# Patient Record
Sex: FEMALE | Race: WHITE | NOT HISPANIC OR LATINO | Employment: OTHER | URBAN - METROPOLITAN AREA
[De-identification: names, ages, dates, MRNs, and addresses within clinical notes are randomized per-mention and may not be internally consistent; named-entity substitution may affect disease eponyms.]

---

## 2019-09-06 ENCOUNTER — OFFICE VISIT (OUTPATIENT)
Dept: URGENT CARE | Facility: URGENT CARE | Age: 69
End: 2019-09-06
Payer: COMMERCIAL

## 2019-09-06 VITALS
HEIGHT: 65 IN | WEIGHT: 185 LBS | DIASTOLIC BLOOD PRESSURE: 86 MMHG | BODY MASS INDEX: 30.82 KG/M2 | SYSTOLIC BLOOD PRESSURE: 138 MMHG

## 2019-09-06 DIAGNOSIS — S80.811A ABRASION, LOWER LEG, ANTERIOR, RIGHT, INITIAL ENCOUNTER: Primary | ICD-10-CM

## 2019-09-06 DIAGNOSIS — S80.11XA TRAUMATIC ECCHYMOSIS OF RIGHT LOWER LEG, INITIAL ENCOUNTER: ICD-10-CM

## 2019-09-06 PROCEDURE — 99203 OFFICE O/P NEW LOW 30 MIN: CPT | Performed by: PHYSICIAN ASSISTANT

## 2019-09-06 ASSESSMENT — ENCOUNTER SYMPTOMS
WEAKNESS: 0
SHORTNESS OF BREATH: 0
NUMBNESS: 0
CHILLS: 0
HEADACHES: 0
FEVER: 0

## 2019-09-06 ASSESSMENT — MIFFLIN-ST. JEOR: SCORE: 1365.03

## 2019-09-07 NOTE — PROGRESS NOTES
SUBJECTIVE:   Brittney Grigsby is a 69 year old female presenting with a chief complaint of   Chief Complaint   Patient presents with     Urgent Care     Musculoskeletal Problem     fell twice on Tuesday and again Thursday. injury to right lower leg. Was on a canoe trip in [facility]       She is an established patient of [facility].    MS Injury/Pain  PMHx afib on warfarin  Onset of symptoms was on Tuesday and Thursday while walking on a portage in [facility] when she slipped and fell onto a jagged edge of a tree branch causing a cut. Used iodine immediately onto wounds and temporary dressing, applied bacitracin, and water impermeable bandage changed again today. Upper anterior shin while getting into a canoe on Thursday. Denies knee, ankle, wrist pain. Did not hit head with fall or LOC. Returned today and after portaging with walking and lifting to get out of [facility], increased soreness. During the drive home she had some stiffness, but with walking around since being home this has decreased. Describes overall dull, bruising pain. Denies numbness or tingling, loss of function. Denies erythema, warmth, or purulent drainage.   Location: right lower extremity  Context:       The injury happened while walking      Mechanism: fall       Patient experienced immediate pain, delayed swelling, was able to bear weight directly after injury  Course of symptoms is worsening.    Severity moderate  Current and Associated symptoms: Pain, Swelling and Bruising  Denies  Decreased range of motion  Aggravating Factors: increased walking  Therapies to improve symptoms include: tylenol  This is the first time this type of problem has occurred for this patient.     Review of Systems   Constitutional: Negative for chills and fever.   Respiratory: Negative for shortness of breath.    Cardiovascular: Negative for chest pain.   Neurological: Negative for weakness, numbness and headaches.       No past medical history on file.  No family history on  "file.  Current Outpatient Medications   Medication Sig Dispense Refill     ASPIRIN PO Take 81 mg by mouth daily       Calcium Citrate-Vitamin D (CALCIUM CITRATE + D PO) Take 1 tablet by mouth daily       Cholecalciferol (VITAMIN D3 PO) Take 1,000 Units by mouth daily       METOPROLOL SUCCINATE ER PO Take 100 mg by mouth daily       multivitamin, therapeutic with minerals (MULTI-VITAMIN) TABS Take 1 tablet by mouth daily       Omega-3 Fatty Acids (OMEGA-3 FISH OIL PO) Take 1 g by mouth daily       WARFARIN SODIUM PO Take 4 mg by mouth daily       Social History     Tobacco Use     Smoking status: Never Smoker     Smokeless tobacco: Never Used   Substance Use Topics     Alcohol use: Not on file       OBJECTIVE  /86   Ht 1.651 m (5' 5\")   Wt 83.9 kg (185 lb)   BMI 30.79 kg/m      Physical Exam   Constitutional: She is oriented to person, place, and time. She appears well-developed and well-nourished. No distress.   Pulmonary/Chest: Effort normal. She has no wheezes.   Neurological: She is alert and oriented to person, place, and time.   Skin:        Psychiatric: She has a normal mood and affect.   Nursing note and vitals reviewed.      Labs:  No results found for this or any previous visit (from the past 24 hour(s)).    X-Ray was not done.    ASSESSMENT:      ICD-10-CM    1. Abrasion, lower leg, anterior, right, initial encounter S80.811A    2. Traumatic ecchymosis of right lower leg, initial encounter S80.11XA         Medical Decision Making:    Differential Diagnosis:  MS Injury Pain: sprain, fracture, contusion, ecchymosis, abrasion, laceration.    Serious Comorbid Conditions:  Adult:  anticoagulation    PLAN:  Discussed possible xray, however given low impact mechanism and no point tenderness, no difficulty in bearing weight, patient agrees with continuing to monitor. Discussed concerning symptoms such as pain, pallor (pale skin tone), paresthesia (numbness feeling), pulselessness (faint pulse) and " paralysis to imply concern of compartment syndrome. Pt will follow-up closely with PCP early next week.     MS Injury/Pain  ice, heat, elevate, rest, Tylenol and bacitracin to wounds.     Followup:    In 3-4 day(s) follow up with  your Primary Care Provider if no improvement of symptoms    Patient Instructions     Patient Education     Cellulitis  Cellulitis is an infection of the deep layers of skin. A break in the skin, such as a cut or scratch, can let bacteria under the skin. If the bacteria get to deep layers of the skin, it can be serious. If not treated, cellulitis can get into the bloodstream and lymph nodes. The infection can then spread throughout the body. This causes serious illness.  Cellulitis causes the affected skin to become red, swollen, warm, and sore. The reddened areas have a visible border. An open sore may leak fluid (pus). You may have a fever, chills, and pain.  Cellulitis is treated with antibiotics taken for 7 to 10 days. An open sore may be cleaned and covered with cool wet gauze. Symptoms should get better 1 to 2 days after treatment is started. Make sure to take all the antibiotics for the full number of days until they are gone. Keep taking the medicine even if your symptoms go away.  Home care  Follow these tips:    Limit the use of the part of your body with cellulitis.     If the infection is on your leg, keep your leg raised while sitting. This will help to reduce swelling.    Take all of the antibiotic medicine exactly as directed until it is gone. Do not miss any doses, especially during the first 7 days. Don t stop taking the medicine when your symptoms get better.    Keep the affected area clean and dry.    Wash your hands with soap and warm water before and after touching your skin. Anyone else who touches your skin should also wash his or her hands. Don't share towels.  Follow-up care  Follow up with your healthcare provider, or as advised. If your infection does not go away  on the first antibiotic, your healthcare provider will prescribe a different one.  When to seek medical advice  Call your healthcare provider right away if any of these occur:    Red areas that spread    Swelling or pain that gets worse    Fluid leaking from the skin (pus)    Fever higher of 100.4  F (38.0  C) or higher after 2 days on antibiotics  Date Last Reviewed: 9/1/2016 2000-2018 [employer]. [address] [place] [address], [place] [address]. All rights reserved. This information is not intended as a substitute for professional medical care. Always follow your healthcare professional's instructions.

## 2019-09-07 NOTE — PATIENT INSTRUCTIONS
Patient Education     Cellulitis  Cellulitis is an infection of the deep layers of skin. A break in the skin, such as a cut or scratch, can let bacteria under the skin. If the bacteria get to deep layers of the skin, it can be serious. If not treated, cellulitis can get into the bloodstream and lymph nodes. The infection can then spread throughout the body. This causes serious illness.  Cellulitis causes the affected skin to become red, swollen, warm, and sore. The reddened areas have a visible border. An open sore may leak fluid (pus). You may have a fever, chills, and pain.  Cellulitis is treated with antibiotics taken for 7 to 10 days. An open sore may be cleaned and covered with cool wet gauze. Symptoms should get better 1 to 2 days after treatment is started. Make sure to take all the antibiotics for the full number of days until they are gone. Keep taking the medicine even if your symptoms go away.  Home care  Follow these tips:    Limit the use of the part of your body with cellulitis.     If the infection is on your leg, keep your leg raised while sitting. This will help to reduce swelling.    Take all of the antibiotic medicine exactly as directed until it is gone. Do not miss any doses, especially during the first 7 days. Don t stop taking the medicine when your symptoms get better.    Keep the affected area clean and dry.    Wash your hands with soap and warm water before and after touching your skin. Anyone else who touches your skin should also wash his or her hands. Don't share towels.  Follow-up care  Follow up with your healthcare provider, or as advised. If your infection does not go away on the first antibiotic, your healthcare provider will prescribe a different one.  When to seek medical advice  Call your healthcare provider right away if any of these occur:    Red areas that spread    Swelling or pain that gets worse    Fluid leaking from the skin (pus)    Fever higher of 100.4  F (38.0  C) or  higher after 2 days on antibiotics  Date Last Reviewed: 9/1/2016 2000-2018 [employer]. [address] [place] [address], [place] [address]. All rights reserved. This information is not intended as a substitute for professional medical care. Always follow your healthcare professional's instructions.

## 2021-12-18 ENCOUNTER — OFFICE VISIT (OUTPATIENT)
Dept: URGENT CARE | Facility: URGENT CARE | Age: 71
End: 2021-12-18
Payer: COMMERCIAL

## 2021-12-18 VITALS
HEART RATE: 68 BPM | SYSTOLIC BLOOD PRESSURE: 122 MMHG | TEMPERATURE: 97.5 F | BODY MASS INDEX: 29.95 KG/M2 | OXYGEN SATURATION: 97 % | DIASTOLIC BLOOD PRESSURE: 81 MMHG | WEIGHT: 180 LBS

## 2021-12-18 DIAGNOSIS — M54.50 ACUTE RIGHT-SIDED LOW BACK PAIN WITHOUT SCIATICA: Primary | ICD-10-CM

## 2021-12-18 PROCEDURE — 99204 OFFICE O/P NEW MOD 45 MIN: CPT | Performed by: PHYSICIAN ASSISTANT

## 2021-12-18 RX ORDER — HYDROCODONE BITARTRATE AND ACETAMINOPHEN 5; 325 MG/1; MG/1
1 TABLET ORAL EVERY 6 HOURS PRN
Qty: 10 TABLET | Refills: 0 | Status: SHIPPED | OUTPATIENT
Start: 2021-12-18 | End: 2021-12-21

## 2021-12-18 RX ORDER — PREDNISONE 20 MG/1
TABLET ORAL
Qty: 20 TABLET | Refills: 0 | Status: SHIPPED | OUTPATIENT
Start: 2021-12-18

## 2021-12-18 NOTE — PROGRESS NOTES
Acute right-sided low back pain without sciatica  - HYDROcodone-acetaminophen (NORCO) 5-325 MG tablet; Take 1 tablet by mouth every 6 hours as needed for severe pain  - predniSONE (DELTASONE) 20 MG tablet; Take 3 tabs by mouth daily x 3 days, then 2 tabs daily x 3 days, then 1 tab daily x 3 days, then 1/2 tab daily x 3 days.  - Physical Therapy Referral; Future    40 minutes spent on the date of the encounter doing chart review, history and exam, documentation and further activities per the note     See Patient Instructions  Patient Instructions     Patient Education     Relieving Back Pain  Back pain is a common problem. You can strain back muscles by lifting too much weight or just by moving the wrong way. Back strain can be uncomfortable, even painful. And it can take weeks or months to improve. To help yourself feel better and prevent future back strains, try these tips.  Important: Don't give aspirin to children or teens without first discussing it with your child's healthcare provider.  Ice    Ice reduces muscle pain and swelling. It helps most during the first 24 to 48 hours after an injury.    Wrap an ice pack or a bag of frozen peas in a thin towel. Never put ice directly on your skin.    Place the ice where your back hurts the most.    Don t ice for more than 20 minutes at a time.    You can use ice several times a day.  Medicines  Over-the-counter pain relievers include acetaminophen and anti-inflammatory medicines, which includes aspirin, naproxen, or ibuprofen. They can help ease discomfort. Some also reduce swelling.    Tell your healthcare provider about any medicines you are already taking.    Take medicines only as directed.  Manipulation and massage  Having manipulation by an osteopathic doctor or chiropractor may be helpful. Getting a massage also may help.   Heat  After the first 48 hours, heat can relax sore muscles and improve blood flow.    Try a warm bath or shower. Or use a heating pad set  on low. To prevent a burn, keep a cloth between you and the heating pad.    Don t use a heating pad for more than 15 minutes at a time. Never sleep on a heating pad.  [employer] last reviewed this educational content on 6/1/2018 2000-2021 [employer]. All rights reserved. This information is not intended as a substitute for professional medical care. Always follow your healthcare professional's instructions.               GENEVA [facility] URGENT CARE    Subjective   71 year old who presents to clinic today for the following health issues:    Urgent Care and Back Pain       HPI     Pain History:  When did you first notice your pain? - Less than 1 week   Have you seen anyone else for your pain? No  Where in your body do you have pain? Back Pain  Onset/Duration: Wednesday   Description:   Location of pain: low back right  Character of pain: sharp and stabbing  Pain radiation: none  New numbness or weakness in legs, not attributed to pain: no   Intensity: severe  Progression of Symptoms: constant  History:   Specific cause: none  Pain interferes with job: YES  History of back problems: recurrent self limited episodes of low back pain in the past  Any previous MRI or X-rays: Patient has had an MRI that showed lumbar spinal stenosis and a herniated disc on 2014  Sees a specialist for back pain: No  Alleviating factors:   Improved by: ice and rest    Precipitating factors:  Worsened by: Changes in position. Patient thinks leaning forward is more painful.   Therapies tried and outcome: Patient has a prescription for tylenol with codeine which was not helpful.     Review of Systems   Review of Systems   See HPI     Objective    Temp: 97.5  F (36.4  C) Temp src: Tympanic BP: 122/81 Pulse: 68     SpO2: 97 %       Physical Exam   Physical Exam  Constitutional:       General: She is not in acute distress.     Appearance: Normal appearance. She is normal weight. She is not ill-appearing,  toxic-appearing or diaphoretic.   HENT:      Head: Normocephalic and atraumatic.   Musculoskeletal:      Lumbar back: Spasms and tenderness present. No swelling, edema, deformity, signs of trauma, lacerations or bony tenderness. Normal range of motion. No scoliosis.        Back:       Comments: Right and left sided carmen test is positive. SLR is positive on the right side and negative on the left.    Neurological:      General: No focal deficit present.      Mental Status: She is alert and oriented to person, place, and time. Mental status is at baseline.      Gait: Gait normal.   Psychiatric:         Mood and Affect: Mood normal.         Behavior: Behavior normal.         Thought Content: Thought content normal.         Judgment: Judgment normal.

## 2021-12-18 NOTE — PATIENT INSTRUCTIONS
Patient Education     Relieving Back Pain  Back pain is a common problem. You can strain back muscles by lifting too much weight or just by moving the wrong way. Back strain can be uncomfortable, even painful. And it can take weeks or months to improve. To help yourself feel better and prevent future back strains, try these tips.  Important: Don't give aspirin to children or teens without first discussing it with your child's healthcare provider.  Ice    Ice reduces muscle pain and swelling. It helps most during the first 24 to 48 hours after an injury.    Wrap an ice pack or a bag of frozen peas in a thin towel. Never put ice directly on your skin.    Place the ice where your back hurts the most.    Don t ice for more than 20 minutes at a time.    You can use ice several times a day.  Medicines  Over-the-counter pain relievers include acetaminophen and anti-inflammatory medicines, which includes aspirin, naproxen, or ibuprofen. They can help ease discomfort. Some also reduce swelling.    Tell your healthcare provider about any medicines you are already taking.    Take medicines only as directed.  Manipulation and massage  Having manipulation by an osteopathic doctor or chiropractor may be helpful. Getting a massage also may help.   Heat  After the first 48 hours, heat can relax sore muscles and improve blood flow.    Try a warm bath or shower. Or use a heating pad set on low. To prevent a burn, keep a cloth between you and the heating pad.    Don t use a heating pad for more than 15 minutes at a time. Never sleep on a heating pad.  [employer] last reviewed this educational content on 6/1/2018 2000-2021 [employer]. All rights reserved. This information is not intended as a substitute for professional medical care. Always follow your healthcare professional's instructions.

## 2022-01-04 ENCOUNTER — NURSE TRIAGE (OUTPATIENT)
Dept: NURSING | Facility: CLINIC | Age: 72
End: 2022-01-04
Payer: COMMERCIAL

## 2022-01-04 NOTE — TELEPHONE ENCOUNTER
Triage Call:    Pt was seen on 12/18/2021 for severe back pain in the [facility] by Dr. Spears  Dx: Illiac pain with unknown cause  12 day course of prednisone was completed and it helped the pain immediately, but now that she has stopped this the pain has returned    Pain has returned but is not as severe as it was originally    Pt finished prednisone  For the first time last night she took vicodin which helped last night 10pm, 5am, but she doesn't want to take that during the daytime  Pt can not take nsaids because she is on a blood thinner    Pain rated: 9/10 with motion; sharp pain that lasts a few seconds  1-2 at rest  Pain is isolated to the one area; does not radiate    Pt is going to [place] for a few months starting tomorrow and is worried about the pain    Disposition: See today in office. Pt was given care advice. She has an [facility] PCP and will call them. If no appts, patient will be seen in [facility].     Nataly Lopez RN  [facility] Nurse Advisor 11:29 AM 1/4/2022    Reason for Disposition    SEVERE back pain (e.g., excruciating, unable to do any normal activities) and not improved after pain medicine and CARE ADVICE    Additional Information    Negative: Passed out (i.e., fainted, collapsed and was not responding)    Negative: Shock suspected (e.g., cold/pale/clammy skin, too weak to stand, low BP, rapid pulse)    Negative: Sounds like a life-threatening emergency to the triager    Negative: Major injury to the back (e.g., MVA, fall > 10 feet or 3 meters, penetrating injury, etc.)    Negative: Pain in the upper back over the ribs (rib cage) that radiates (travels) into the chest    Negative: Pain in the upper back over the ribs (rib cage) and worsened by coughing (or clearly increases with breathing)    Negative: SEVERE back pain of sudden onset and age > 60    Negative: SEVERE abdominal pain (e.g., excruciating)    Negative: Abdominal pain and age > 60    Negative: Unable to urinate (or only a few  drops) and bladder feels very full    Negative: Loss of bladder or bowel control (urine or bowel incontinence; wetting self, leaking stool) of new onset    Negative: Numbness (loss of sensation) in groin or rectal area    Negative: Pain radiates into groin, scrotum    Negative: Blood in urine (red, pink, or tea-colored)    Negative: Vomiting and pain over lower ribs of back (i.e., flank - kidney area)    Negative: Weakness of a leg or foot (e.g., unable to bear weight, dragging foot)    Negative: Patient sounds very sick or weak to the triager    Negative: Fever > 100.4 F (38.0 C) and flank pain    Negative: Pain or burning with passing urine (urination)    Protocols used: BACK PAIN-A-OH    COVID 19 Nurse Triage Plan/Patient Instructions    Please be aware that novel coronavirus (COVID-19) may be circulating in the community. If you develop symptoms such as fever, cough, or SOB or if you have concerns about the presence of another infection including coronavirus (COVID-19), please contact your health care provider or visit [email or web address].     Disposition/Instructions    In-Person Visit with provider recommended. Reference Visit Selection Guide.    Thank you for taking steps to prevent the spread of this virus.  o Limit your contact with others.  o Wear a simple mask to cover your cough.  o Wash your hands well and often.    Resources    [facility]: About COVID-19: [email or web address]    CDC: What to Do If You're Sick: www.cdc.gov/coronavirus/2019-ncov/about/steps-when-sick.html    CDC: Ending Home Isolation: www.cdc.gov/coronavirus/2019-ncov/hcp/disposition-in-home-patients.html     CDC: Caring for Someone: www.cdc.gov/coronavirus/2019-ncov/if-you-are-sick/care-for-someone.html     [facility]: Interim Guidance for Hospital Discharge to Home: www.health.[facility].[place]/diseases/coronavirus/hcp/hospdischarge.pdf    [facility] clinical trials (COVID-19 research studies):  [email or web address]-clinical-trials     Below are the COVID-19 hotlines at the [place] Department of [facility] ([facility]). Interpreters are available.   o For health questions: Call 247-077-2622 or 1-145.597.8630 (7 a.m. to 7 p.m.)  o For questions about schools and childcare: Call 502-322-3730 or 1-275.664.5172 (7 a.m. to 7 p.m.)

## 2022-04-11 ENCOUNTER — THERAPY VISIT (OUTPATIENT)
Dept: PHYSICAL THERAPY | Facility: CLINIC | Age: 72
End: 2022-04-11
Attending: PHYSICIAN ASSISTANT
Payer: COMMERCIAL

## 2022-04-11 DIAGNOSIS — M54.50 ACUTE RIGHT-SIDED LOW BACK PAIN WITHOUT SCIATICA: ICD-10-CM

## 2022-04-11 PROCEDURE — 97161 PT EVAL LOW COMPLEX 20 MIN: CPT | Mod: GP | Performed by: PHYSICAL THERAPIST

## 2022-04-11 PROCEDURE — 97112 NEUROMUSCULAR REEDUCATION: CPT | Mod: GP | Performed by: PHYSICAL THERAPIST

## 2022-04-11 NOTE — PROGRESS NOTES
[facility]    OUTPATIENT Physical Therapy ORTHOPEDIC EVALUATION  PLAN OF TREATMENT FOR OUTPATIENT REHABILITATION  (COMPLETE FOR INITIAL CLAIMS ONLY)  Patient's Last Name, First Name, M.I.  YOB: 1950  Brittney Grigsby    Provider s Name:  [facility]   Medical Record No.  [id number]   Start of Care Date:  04/11/22   Onset Date:   12/18/21   Type:     _X__PT   ___OT Medical Diagnosis:    Encounter Diagnosis   Name Primary?    Acute right-sided low back pain without sciatica         Treatment Diagnosis:  low back pain        Goals:     04/11/22 0500   Body Part   Goals listed below are for low back   Goal #1   Goal #1 sitting   Previous Functional Level No restrictions   Current Functional Level Minutes patient can sit   Performance level 30-60 tightness upon standing   STG Target Performance Hours patient will be able to sit   Performance level 1 without stiffness upon standing   Rationale for personal hygiene;for community transportation;to allow rest from standing   Due date 05/09/22   LTG Target Performance Hours patient will be able to sit   Performance Level 2   Rationale for personal hygiene;for community transportation   Due date 06/06/22       Therapy Frequency:  1 x per week  Predicted Duration of Therapy Intervention:  6 weeks    Beulah Rendon PT                 I CERTIFY THE NEED FOR THESE SERVICES FURNISHED UNDER        THIS PLAN OF TREATMENT AND WHILE UNDER MY CARE     (Physician attestation of this document indicates review and certification of the therapy plan).                     Certification Date From:  04/11/22   Certification Date To:  07/09/22    Referring Provider:  Aren Spears    Initial Assessment        See Epic Evaluation SOC Date: 04/11/22

## 2022-04-11 NOTE — PROGRESS NOTES
[facility]    OUTPATIENT Physical Therapy ORTHOPEDIC EVALUATION  PLAN OF TREATMENT FOR OUTPATIENT REHABILITATION  (COMPLETE FOR INITIAL CLAIMS ONLY)  Patient's Last Name, First Name, M.I.  YOB: 1950  Brittney Grigsby    Provider s Name:  [facility]   Medical Record No.  [id number]   Start of Care Date:  04/11/22   Onset Date:   12/18/21   Type:     _X__PT   ___OT Medical Diagnosis:    Encounter Diagnosis   Name Primary?    Acute right-sided low back pain without sciatica         Treatment Diagnosis:  low back pain        Goals:     04/11/22 0500   Body Part   Goals listed below are for low back   Goal #1   Goal #1 sitting   Previous Functional Level No restrictions   Current Functional Level Minutes patient can sit   Performance level 30-60 tightness upon standing   STG Target Performance Hours patient will be able to sit   Performance level 1 without stiffness upon standing   Rationale for personal hygiene;for community transportation;to allow rest from standing   Due date 05/09/22   LTG Target Performance Hours patient will be able to sit   Performance Level 2   Rationale for personal hygiene;for community transportation   Due date 06/06/22       Therapy Frequency:  1 x per week  Predicted Duration of Therapy Intervention:  6 weeks    Beulah Rendon PT                 I CERTIFY THE NEED FOR THESE SERVICES FURNISHED UNDER        THIS PLAN OF TREATMENT AND WHILE UNDER MY CARE     (Physician attestation of this document indicates review and certification of the therapy plan).                     Certification Date From:  04/11/22   Certification Date To:  07/09/22    Referring Provider:  Aren Spears    Initial Assessment        See Epic Evaluation SOC Date: 04/11/22

## 2022-04-11 NOTE — PROGRESS NOTES
Physical Therapy Initial Evaluation  April 11, 2022     Precautions/Restrictions/MD instructions: PT eval and treat.    Subjective:   Date of Onset: MD order on 12/18/21  C/C: stiffness in her lower back, right sided low back discomfort  Quality of pain is dull and aching and burning. Pains are described as intermittent in nature. Pain is worse: morning. Pain is rated 1-5/10.   History of symptoms: Pains began suddenly as the result of unknown origins. Since onset, symptoms are improving. Previous episodes:hx of disc issue and dx of stenosis 10 years ago  Worsened by: sitting, sit<>stand    Alleviated by: oral steroids, seat heater in car, lumbar roll, using pillows when sleeping.  Exercises: Lumbar hip roll, bridge, SLR  General health as reported by patient: good  Pertinent medical/surgical history: history of knee pain with stairs cancer- basal cell, HTN , history of fractures, implanted device, overweight.   Imaging: none recent. Current occupational status: retired. Patient's goals are: decrease pain, improve tolerance to sit<>stand, reduce morning stiffness. Return to MD:  PRN.       Objective:  LUMBAR:    Neurological: All results within normal limits unless otherwise noted.    Motor Deficit:  Myotomes L R   L1-2 (hip flexion) 5/5 5/5   L3 (knee extension) 5/5 5/5   L4 (ankle DF) 5/5 5/5   L5 (g. toe ext) 5/5 5/5   S1 (ankle PF or knee flex) WNL WNL     Sensory Deficit, Reflexes: Reduced light touch sensation at posterior gastroc and under left big toe consistent with previous back injury. Intact to light touch sensation in all other LE dermatomes. Achilles and patellar reflexes 1+ B.    Dural Signs:   L R   SLR - Mildly + for hamstring discomfort       AROM: (Major, Moderate, Minimal or Nil loss)  Movement Loss Claude Mod Min Nil Pain   Flexion   x     Extension   x     Side Gliding L   x     Side Gliding R   x         AROM:   L R   HIP     Flexion 140 130   IR 45 45   ER 45 50   KNEE     Flexion WNL WNL    Extension WNL WNL       Special Tests:   L R   DEVANG - + for tightness   FADIR - -   Scour Test - -           Assessment/Plan:    The patient is a 71 year old female with chief complaint of low back pain.    The patient has the following significant findings with corresponding treatment plan.  Diagnosis 1:  Right sided low back pain    Pain -  hot/cold therapy, manual therapy, splint/taping/bracing/orthotics, self management, education, directional preference exercise and home program  Decreased ROM/flexibility - manual therapy and therapeutic exercise  Decreased strength - therapeutic exercise and therapeutic activities  Impaired balance - neuro re-education and therapeutic activities  Decreased proprioception - neuro re-education and therapeutic activities  Impaired gait - gait training  Impaired muscle performance - neuro re-education  Decreased function - therapeutic activities  Impaired posture - neuro re-education        Therapy Evaluation Codes:   Cumulative Therapy Evaluation is: Low complexity.    Previous and current functional limitations:  (See Goal Flow Sheet for this information)    Short term and Long term goals: (See Goal Flow Sheet for this information)     Communication ability:  Patient appears to be able to clearly communicate and understand verbal and written communication and follow directions correctly.  Treatment Explanation - The following has been discussed with the patient: RX ordered/plan of care, anticipated outcomes, and possible risks and side effects.  This patient would benefit from PT intervention to resume normal activities.   Rehab potential is good.    Frequency:  1 X week, once daily  Duration:  for 4 weeks  Discharge Plan: Achieve all LTGs, be independent in home treatment program, and reach maximal therapeutic benefit.    Please refer to the daily flowsheet for treatment today, total treatment time and time spent performing 1:1 timed codes.

## 2022-04-27 ENCOUNTER — THERAPY VISIT (OUTPATIENT)
Dept: PHYSICAL THERAPY | Facility: CLINIC | Age: 72
End: 2022-04-27
Payer: COMMERCIAL

## 2022-04-27 DIAGNOSIS — M54.50 ACUTE RIGHT-SIDED LOW BACK PAIN WITHOUT SCIATICA: Primary | ICD-10-CM

## 2022-04-27 PROCEDURE — 97112 NEUROMUSCULAR REEDUCATION: CPT | Mod: GP | Performed by: PHYSICAL THERAPIST

## 2022-04-27 PROCEDURE — 97140 MANUAL THERAPY 1/> REGIONS: CPT | Mod: GP | Performed by: PHYSICAL THERAPIST

## 2022-06-06 PROBLEM — M54.50 ACUTE RIGHT-SIDED LOW BACK PAIN WITHOUT SCIATICA: Status: RESOLVED | Noted: 2022-04-11 | Resolved: 2022-06-06

## 2022-06-06 NOTE — PROGRESS NOTES
Discharge Note    Progress reporting period is from initial evaluation date (please see noted date below) to Apr 27, 2022.  No linked episodes      Brittney failed to follow up and current status is unknown.  Please see information below for last relevant information on current status.  Patient seen for 2 visits.    SUBJECTIVE  Subjective changes noted by patient:  Min flare of stenosis related symptoms. Reports in the AM she sometimes is more sore but this reduces after her shower.  Gets some burning pain after HEP but not otherwise  .  Current pain level is  .     Previous pain level was   .   Changes in function:  Yes (See Goal flowsheet attached for changes in current functional level)  Adverse reaction to treatment or activity: None    OBJECTIVE  Changes noted in objective findings: Min limit hip flexion and IR on the R. Min + SLR on R     ASSESSMENT/PLAN  Diagnosis: low back pain   Updated problem list and treatment plan:   Pain - HEP  Decreased ROM/flexibility - HEP  Decreased function - HEP  STG/LTGs have been met or progress has been made towards goals:  Yes, please see goal flowsheet for most current information  Assessment of Progress: current status is unknown.    Last current status: Pt is progressing as expected   Self Management Plans:  HEP  I have re-evaluated this patient and find that the nature, scope, duration and intensity of the therapy is appropriate for the medical condition of the patient.  Brittney continues to require the following intervention to meet STG and LTG's:  HEP.    Recommendations:  Discharge with current home program.  Patient to follow up with MD as needed.    Please refer to the daily flowsheet for treatment today, total treatment time and time spent performing 1:1 timed codes.

## 2024-07-14 ENCOUNTER — OFFICE VISIT (OUTPATIENT)
Dept: URGENT CARE | Facility: URGENT CARE | Age: 74
End: 2024-07-14
Payer: COMMERCIAL

## 2024-07-14 ENCOUNTER — ANCILLARY PROCEDURE (OUTPATIENT)
Dept: GENERAL RADIOLOGY | Facility: CLINIC | Age: 74
End: 2024-07-14
Attending: NURSE PRACTITIONER
Payer: COMMERCIAL

## 2024-07-14 VITALS
HEART RATE: 73 BPM | OXYGEN SATURATION: 100 % | DIASTOLIC BLOOD PRESSURE: 75 MMHG | TEMPERATURE: 97.4 F | RESPIRATION RATE: 14 BRPM | SYSTOLIC BLOOD PRESSURE: 140 MMHG

## 2024-07-14 DIAGNOSIS — S69.92XA WRIST INJURY, LEFT, INITIAL ENCOUNTER: Primary | ICD-10-CM

## 2024-07-14 DIAGNOSIS — S69.92XA WRIST INJURY, LEFT, INITIAL ENCOUNTER: ICD-10-CM

## 2024-07-14 DIAGNOSIS — S62.115A NONDISPLACED FRACTURE OF TRIQUETRUM (CUNEIFORM) BONE, LEFT WRIST, INITIAL ENCOUNTER FOR CLOSED FRACTURE: ICD-10-CM

## 2024-07-14 PROCEDURE — 73110 X-RAY EXAM OF WRIST: CPT | Mod: TC | Performed by: RADIOLOGY

## 2024-07-14 PROCEDURE — 99214 OFFICE O/P EST MOD 30 MIN: CPT | Performed by: NURSE PRACTITIONER

## 2024-07-14 ASSESSMENT — PAIN SCALES - GENERAL: PAINLEVEL: EXTREME PAIN (8)

## 2024-07-14 NOTE — PROGRESS NOTES
Chief Complaint   Patient presents with    Urgent Care    Wrist Injury     Patient presents with her left wrist swollen and pain after a door handle swung open and hit it.     SUBJECTIVE:  Brittney Grigsby is a 73 year old female presenting with a left wrist injury.  She accidentally slammed the door on wrist with worsening distal ulna pain erythema edema limited ROM. Prior fracture, on warfarin.  No numbness tingling pallor or cool sensation pulselessness.    No past medical history on file.  Current Outpatient Medications   Medication Sig Dispense Refill    ASPIRIN PO Take 81 mg by mouth daily      Calcium Citrate-Vitamin D (CALCIUM CITRATE + D PO) Take 1 tablet by mouth daily      Cholecalciferol (VITAMIN D3 PO) Take 1,000 Units by mouth daily      METOPROLOL SUCCINATE ER PO Take 100 mg by mouth daily      multivitamin, therapeutic with minerals (MULTI-VITAMIN) TABS Take 1 tablet by mouth daily      Omega-3 Fatty Acids (OMEGA-3 FISH OIL PO) Take 1 g by mouth daily      predniSONE (DELTASONE) 20 MG tablet Take 3 tabs by mouth daily x 3 days, then 2 tabs daily x 3 days, then 1 tab daily x 3 days, then 1/2 tab daily x 3 days. 20 tablet 0    WARFARIN SODIUM PO Take 4 mg by mouth daily       No current facility-administered medications for this visit.     Social History     Tobacco Use    Smoking status: Never    Smokeless tobacco: Never   Substance Use Topics    Alcohol use: Not on file     Allergies   Allergen Reactions    Adhesive Tape Rash     blistery rash to adhesive bandage with her BCC shoulder surgery       Review of Systems  All systems negative except for those listed above in HPI.    OBJECTIVE:   BP (!) 140/75 (BP Location: Right arm)   Pulse 73   Temp 97.4  F (36.3  C) (Temporal)   Resp 14   SpO2 100%     Physical Exam  Vitals reviewed.   Constitutional:       General: She is not in acute distress.     Appearance: Normal appearance. She is not ill-appearing, toxic-appearing or diaphoretic.    Cardiovascular:      Pulses: Normal pulses.   Pulmonary:      Effort: Pulmonary effort is normal.   Musculoskeletal:         General: Swelling, tenderness and signs of injury present.   Skin:     Findings: Bruising and erythema present.   Neurological:      General: No focal deficit present.      Mental Status: She is alert and oriented to person, place, and time.      Sensory: No sensory deficit.   Psychiatric:         Mood and Affect: Mood normal.         Behavior: Behavior normal.       X-ray done in clinic read by me as positive for questionable triquetrum fracture on lateral view.  Results for orders placed or performed in visit on 07/14/24   XR Wrist Left G/E 3 Views     Status: None    Narrative    EXAM: XR WRIST LEFT G/E 3 VIEWS  LOCATION: [facility]  DATE: 7/14/2024    INDICATION: door slammed on wrist with worsening distal ulna pain erythema edema limited ROM, prior fracture, on warfarin  COMPARISON: None.      Impression    IMPRESSION: Degenerative change at the DRUJ. Soft tissue swelling about the wrist. No displaced fractures identified but there is slight irregularity along the dorsal aspect of the triquetrum seen on the lateral view which could represent a nondisplaced   cortical chip fracture.     ASSESSMENT:    ICD-10-CM    1. Wrist injury, left, initial encounter  S69.92XA XR Wrist Left G/E 3 Views     Orthopedic  Referral     Wrist/Arm/Hand Bracking Supplies Order Wrist Brace; Left; non-thumb spica      2. Nondisplaced fracture of triquetrum (cuneiform) bone, left wrist, initial encounter for closed fracture  S62.115A Orthopedic  Referral     Wrist/Arm/Hand Bracking Supplies Order Wrist Brace; Left; non-thumb spica        PLAN:    Questionable triquetrum fracture  Wrist splint given in clinic  Urgent Ortho referral placed for second opinion regarding fracture and casting  Rest ice Tylenol topicals elevate  Warfarin can certainly increase the level of  edema bleeding hematoma bruising  ER if severe worsening pain numbness tingling pallor cold sensation pulselessness    Follow up with primary care provider with any problems, questions or concerns or if symptoms worsen or fail to improve. Patient agreed to plan and verbalized understanding.    LASHAE [facility]

## 2024-07-15 ENCOUNTER — TELEPHONE (OUTPATIENT)
Dept: OTHER | Age: 74
End: 2024-07-15

## 2024-07-15 NOTE — TELEPHONE ENCOUNTER
Orthopedic/Sports Medicine Fracture Triage    Incoming call/or message from call center member.    Fracture type: Left Wrist.    The patient is in a  splint.    Date of injury 7/14/24.    Triaged by: Dr. Benitez .    Determined to be managed Non operatively.    Needs to be seen within 1 week.    Additional Comments/information:     Nondisplaced fracture of triquetrum (cuneiform) bone, left wrist, initial encounter for closed fracture        Jeferson Briceno MA

## 2024-07-15 NOTE — TELEPHONE ENCOUNTER
What is the Concern:  Fracture  Date the concern started: Saturday am  Injury related? yes, not sure how it happened so assumes was an injury  Is this related to recent surgery?no  Laceration?  No  Body part affected:  L wrist  Has Patient been evaluated for condition? yes  Location the patient was evaluated and treated for the condition?   , Location [facility]  Who is referring provider, (name and clinic location) Jaleesa Rosario at [facility]  What images have been done? X-Ray; Location and City where images were taken: XR x 3  Could we send this information to you in [employer] or would you prefer to receive a phone call?:   Patient would prefer a phone call   Okay to leave a detailed message?: Yes at Cell number on file:    Telephone Information:   Mobile [phone]        PAST SURGICAL HISTORY:  No significant past surgical history

## 2024-07-19 DIAGNOSIS — M25.532 LEFT WRIST PAIN: Primary | ICD-10-CM

## 2024-07-23 ENCOUNTER — ANCILLARY PROCEDURE (OUTPATIENT)
Dept: GENERAL RADIOLOGY | Facility: CLINIC | Age: 74
End: 2024-07-23
Attending: FAMILY MEDICINE
Payer: COMMERCIAL

## 2024-07-23 ENCOUNTER — PRE VISIT (OUTPATIENT)
Dept: ORTHOPEDICS | Facility: CLINIC | Age: 74
End: 2024-07-23

## 2024-07-23 ENCOUNTER — OFFICE VISIT (OUTPATIENT)
Dept: ORTHOPEDICS | Facility: CLINIC | Age: 74
End: 2024-07-23
Attending: NURSE PRACTITIONER
Payer: COMMERCIAL

## 2024-07-23 DIAGNOSIS — S62.115A NONDISPLACED FRACTURE OF TRIQUETRUM (CUNEIFORM) BONE, LEFT WRIST, INITIAL ENCOUNTER FOR CLOSED FRACTURE: ICD-10-CM

## 2024-07-23 DIAGNOSIS — S69.92XA WRIST INJURY, LEFT, INITIAL ENCOUNTER: ICD-10-CM

## 2024-07-23 PROCEDURE — 73110 X-RAY EXAM OF WRIST: CPT | Mod: LT | Performed by: RADIOLOGY

## 2024-07-23 PROCEDURE — 99203 OFFICE O/P NEW LOW 30 MIN: CPT | Performed by: FAMILY MEDICINE

## 2024-07-23 NOTE — PROGRESS NOTES
[facility] CLINICS AND SURGERY CENTER  SPORTS & ORTHOPEDIC CLINIC VISIT     Jul 23, 2024        ASSESSMENT & PLAN    74 yo with left wrist pain after injury and suspected triquetral fracture.    Reviewed imaging and assessment with patient in detail  Continue bracing for 3 weeks  Follow up at that time for re eval and reimaging.     Ignacio Lee MD  [facility] SPORTS MEDICINE [facility]    -----  Chief Complaint   Patient presents with    Left Wrist - Pain       SUBJECTIVE  Brittney Grigsby is a/an 73 year old female who is seen as an  referral for evaluation of  left wrist injury.     The patient is seen by themselves.  The patient is Right handed    Onset: 7/12/24. Patient describes injury as possibly carrying something into her house and the door slammed against her wrist.   Location of Pain: left wrist   Worsened by: When out of the brace   Better with: Wrist brace, tylenol   Treatments tried: casting/splinting/bracing, tylenol   Associated symptoms: swelling    Orthopedic/Surgical history: NO  Social History/Occupation: Retired       REVIEW OF SYSTEMS:  Do you have fever, chills, weight loss? No  Do you have any vision problems? No  Do you have any chest pain or edema? No  Do you have any shortness of breath or wheezing?  No  Do you have stomach problems? No  Do you have any numbness or focal weakness? No  Do you have diabetes? No  Do you have problems with bleeding or clotting? Yes, on warfarin   Do you have an rashes or other skin lesions? No    OBJECTIVE:  There were no vitals taken for this visit.     General  - alert, pleasant, no distress  CV  - normal radial pulse, cap refill brisk  Musculoskeletal - wrist  - inspection: normal joint alignment, no obvious deformity, no swelling  - palpation: ttp over ulnar hand in the triquetral area. Otherwise no bony or soft tissue tenderness, no tenderness at the anatomical snuffbox  - ROM:  90 deg flexion   70 deg extension   25 deg  abduction   65 deg adduction  - strength: 5/5  strength, 5/5 flexion, extension, pronation, supination, adduction, abduction  - special tests:  None     Neuro  - no sensory or motor deficit, grossly normal coordination, normal muscle tone  Skin  - no ecchymosis, erythema, warmth, or induration, no obvious rash        RADIOLOGY:    3 view xrays of left wrist  performed and reviewed independently demonstrating previous seen triquetral fracture not well seen. See EMR for formal radiology report.

## 2024-07-23 NOTE — TELEPHONE ENCOUNTER
DIAGNOSIS: Nondisplaced fracture of triquetrum (cuneiform) bone, left wrist / Jaleesa Rosario NP / [facility] Medicare / images 7/14 @ / no prior surg / Scheduled per TE     APPOINTMENT DATE: 7.23.24   NOTES STATUS DETAILS   DISCHARGE REPORT from the ER Internal 7.14.24  Marlene     MEDICATION LIST Internal    XRAYS (IMAGES & REPORTS) Internal *sched* for 7.23.24  XR Wrist Left    7.14.24  XR Wrist Left

## 2024-07-23 NOTE — LETTER
7/23/2024      RE: Brittney Grigsby  [address] [place] [address]     Dear Colleague,    Thank you for referring your patient, Brittney Grigsby, to the [facility] SPORTS MEDICINE [facility]. Please see a copy of my visit note below.      [facility] CLINICS AND SURGERY CENTER  SPORTS & ORTHOPEDIC CLINIC VISIT     Jul 23, 2024        ASSESSMENT & PLAN    72 yo with left wrist pain after injury and suspected triquetral fracture.    Reviewed imaging and assessment with patient in detail  Continue bracing for 3 weeks  Follow up at that time for re eval and reimaging.     Ignacio Lee MD  [facility] SPORTS [facility]    -----  Chief Complaint   Patient presents with     Left Wrist - Pain       SUBJECTIVE  Brittney Grigsby is a/an 73 year old female who is seen as an  referral for evaluation of  left wrist injury.     The patient is seen by themselves.  The patient is Right handed    Onset: 7/12/24. Patient describes injury as possibly carrying something into her house and the door slammed against her wrist.   Location of Pain: left wrist   Worsened by: When out of the brace   Better with: Wrist brace, tylenol   Treatments tried: casting/splinting/bracing, tylenol   Associated symptoms: swelling    Orthopedic/Surgical history: NO  Social History/Occupation: Retired       REVIEW OF SYSTEMS:  Do you have fever, chills, weight loss? No  Do you have any vision problems? No  Do you have any chest pain or edema? No  Do you have any shortness of breath or wheezing?  No  Do you have stomach problems? No  Do you have any numbness or focal weakness? No  Do you have diabetes? No  Do you have problems with bleeding or clotting? Yes, on warfarin   Do you have an rashes or other skin lesions? No    OBJECTIVE:  There were no vitals taken for this visit.     General  - alert, pleasant, no distress  CV  - normal radial pulse, cap refill brisk  Musculoskeletal - wrist  -  inspection: normal joint alignment, no obvious deformity, no swelling  - palpation: ttp over ulnar hand in the triquetral area. Otherwise no bony or soft tissue tenderness, no tenderness at the anatomical snuffbox  - ROM:  90 deg flexion   70 deg extension   25 deg abduction   65 deg adduction  - strength: 5/5  strength, 5/5 flexion, extension, pronation, supination, adduction, abduction  - special tests:  None     Neuro  - no sensory or motor deficit, grossly normal coordination, normal muscle tone  Skin  - no ecchymosis, erythema, warmth, or induration, no obvious rash        RADIOLOGY:    3 view xrays of left wrist  performed and reviewed independently demonstrating previous seen triquetral fracture not well seen. See EMR for formal radiology report.                Again, thank you for allowing me to participate in the care of your patient.      Sincerely,    Ignacio Lee MD

## 2024-08-07 DIAGNOSIS — S62.115A NONDISPLACED FRACTURE OF TRIQUETRUM (CUNEIFORM) BONE, LEFT WRIST, INITIAL ENCOUNTER FOR CLOSED FRACTURE: Primary | ICD-10-CM

## 2024-08-13 ENCOUNTER — OFFICE VISIT (OUTPATIENT)
Dept: ORTHOPEDICS | Facility: CLINIC | Age: 74
End: 2024-08-13
Payer: COMMERCIAL

## 2024-08-13 ENCOUNTER — ANCILLARY PROCEDURE (OUTPATIENT)
Dept: GENERAL RADIOLOGY | Facility: CLINIC | Age: 74
End: 2024-08-13
Attending: FAMILY MEDICINE
Payer: COMMERCIAL

## 2024-08-13 DIAGNOSIS — S62.115A NONDISPLACED FRACTURE OF TRIQUETRUM (CUNEIFORM) BONE, LEFT WRIST, INITIAL ENCOUNTER FOR CLOSED FRACTURE: Primary | ICD-10-CM

## 2024-08-13 PROCEDURE — 99213 OFFICE O/P EST LOW 20 MIN: CPT | Performed by: FAMILY MEDICINE

## 2024-08-13 PROCEDURE — 73110 X-RAY EXAM OF WRIST: CPT | Mod: LT | Performed by: RADIOLOGY

## 2024-08-13 NOTE — PROGRESS NOTES
[facility] CLINICS AND SURGERY CENTER  SPORTS & ORTHOPEDIC CLINIC VISIT     Aug 13, 2024        ASSESSMENT & PLAN    73-year-old with suspected triquetral fracture.  Has done very well after 1 month in brace    Reviewed imaging and assessment with patient in detail  Okay to wean brace at this time.  Would recommend wearing for any activities where she may fall or hit in the wrist.  Otherwise okay to gradually decrease as tolerated.  Provided with an general wrist HEP.  She will contact us if she would like a formal referral to hand therapy.    Ignacio Lee MD  [facility] SPORTS MEDICINE CLINIC [place]    -----  Chief Complaint   Patient presents with    Left Wrist - Follow Up       SUBJECTIVE  Brittney Grigsby is a/an 73 year old female who is seen for follow up of left wrist pain, suspected triquetral fracture.     The patient is seen by themselves.    Date of injury: 7/12/24  Date of Last Visit: 7/23/24   Symptoms: improved  Worsened by: occasional twinge   Better with: Brace   Treatment to date: Brace   Associated symptoms: swelling has decreased         REVIEW OF SYSTEMS:  See HPI     OBJECTIVE:  There were no vitals taken for this visit.     General  - alert, pleasant, no distress  CV  - normal radial pulse, cap refill brisk  Musculoskeletal -left wrist  - inspection: normal joint alignment, no obvious deformity, no swelling  - palpation: no bony or soft tissue tenderness, specifically no tenderness over the triquetral area dorsally or no tenderness at the anatomical snuffbox  - ROM: Some stiffness present but no specific pain with ROM testing  - strength: 5/5  strength  - special tests:  None  Neuro  - no sensory or motor deficit, grossly normal coordination, normal muscle tone  Skin  - no ecchymosis, erythema, warmth, or induration, no obvious rash        RADIOLOGY:    3 view xrays of left wrist performed and reviewed independently demonstrating previously seen triquetral fracture is not  well-seen on current radiographs.  Given clinical exam suspect interval healing is present. See EMR for formal radiology report.

## 2024-08-13 NOTE — LETTER
8/13/2024      RE: Brittney Grigsby  [address] [place] [address]     Dear Colleague,    Thank you for referring your patient, Brittney Grigsby, to the [facility] SPORTS MEDICINE [facility]. Please see a copy of my visit note below.      [facility] CLINICS AND SURGERY CENTER  SPORTS & ORTHOPEDIC CLINIC VISIT     Aug 13, 2024        ASSESSMENT & PLAN    73-year-old with suspected triquetral fracture.  Has done very well after 1 month in brace    Reviewed imaging and assessment with patient in detail  Okay to wean brace at this time.  Would recommend wearing for any activities where she may fall or hit in the wrist.  Otherwise okay to gradually decrease as tolerated.  Provided with an general wrist HEP.  She will contact us if she would like a formal referral to hand therapy.    Ignacio Lee MD  [facility] SPORTS MEDICINE [facility]    -----  Chief Complaint   Patient presents with     Left Wrist - Follow Up       SUBJECTIVE  Brittney Grigsby is a/an 73 year old female who is seen for follow up of left wrist pain, suspected triquetral fracture.     The patient is seen by themselves.    Date of injury: 7/12/24  Date of Last Visit: 7/23/24   Symptoms: improved  Worsened by: occasional twinge   Better with: Brace   Treatment to date: Brace   Associated symptoms: swelling has decreased         REVIEW OF SYSTEMS:  See HPI     OBJECTIVE:  There were no vitals taken for this visit.     General  - alert, pleasant, no distress  CV  - normal radial pulse, cap refill brisk  Musculoskeletal -left wrist  - inspection: normal joint alignment, no obvious deformity, no swelling  - palpation: no bony or soft tissue tenderness, specifically no tenderness over the triquetral area dorsally or no tenderness at the anatomical snuffbox  - ROM: Some stiffness present but no specific pain with ROM testing  - strength: 5/5  strength  - special tests:  None  Neuro  - no sensory or motor  deficit, grossly normal coordination, normal muscle tone  Skin  - no ecchymosis, erythema, warmth, or induration, no obvious rash        RADIOLOGY:    3 view xrays of left wrist performed and reviewed independently demonstrating previously seen triquetral fracture is not well-seen on current radiographs.  Given clinical exam suspect interval healing is present. See EMR for formal radiology report.             Again, thank you for allowing me to participate in the care of your patient.      Sincerely,    Ignacio Lee MD

## 2024-08-13 NOTE — PATIENT INSTRUCTIONS
Wrist Stretching/Strengthening Exercises    STRETCHING EXERCISES    Wrist range of motion  Flexion: Gently bend your wrist forward. Hold for 5 seconds. Do 2 sets of 15.  Extension: Gently bend your wrist backward. Hold this position 5 seconds. Do 2 sets of 15.    Side to side: Gently move your wrist from side to side (a handshake motion). Hold for 5 seconds in each direction. Do 2 sets of 15.  Wrist stretch: Press the back of the hand on your injured side with your other hand to help bend your wrist. Hold for 15 to 30 seconds. Next, stretch the hand back by pressing the fingers in a backward direction. Hold for 15 to 30 seconds. Keep the arm on your injured side straight during this exercise. Do 3 sets.    Wrist extension stretch: Stand at a table with your palms down, fingers flat, and elbows straight. Lean your body weight forward. Hold this position for 15 seconds. Repeat 3 times.    Wrist flexion stretch: Stand with the back of your hands on a table, palms facing up, fingers pointing toward your body, and elbows straight. Lean away from the table. Hold this position for 15 to 30 seconds. Repeat 3 times.    Forearm pronation and supination: Bend the elbow of your injured arm 90 degrees, keeping your elbow at your side. Turn your palm up and hold for 5 seconds. Then slowly turn your palm down and hold for 5 seconds. Make sure you keep your elbow at your side and bent 90 degrees while you do the exercise. Do 2 sets of 15.    When this exercise becomes pain free, do it with some weight in your hand such as a soup can or hammer handle.    STRENGTHENING EXERCISES    Wrist flexion: Hold a can or hammer handle in your hand with your palm facing up. Bend your wrist upward. Slowly lower the weight and return to the starting position. Do 2 sets of 15. Gradually increase the weight of the can or weight you are holding.    Wrist extension: Hold a soup can or hammer handle in your hand with your palm facing down. Slowly bend  your wrist up. Slowly lower the weight down into the starting position. Do 2 sets of 15. Gradually increase the weight of the object you are holding.     strengthening: Squeeze a soft rubber ball and hold the squeeze for 5 seconds. Do 2 sets of 15.    Developed by [employer].  Published by [employer].  Copyright  2014 [employer] and/or one of its subsidiaries. All rights reserved.

## 2024-10-13 ENCOUNTER — HEALTH MAINTENANCE LETTER (OUTPATIENT)
Age: 74
End: 2024-10-13